# Patient Record
Sex: MALE | Race: WHITE
[De-identification: names, ages, dates, MRNs, and addresses within clinical notes are randomized per-mention and may not be internally consistent; named-entity substitution may affect disease eponyms.]

---

## 2018-04-03 ENCOUNTER — HOSPITAL ENCOUNTER (EMERGENCY)
Dept: HOSPITAL 62 - ER | Age: 33
Discharge: HOME | End: 2018-04-03
Payer: OTHER GOVERNMENT

## 2018-04-03 VITALS — DIASTOLIC BLOOD PRESSURE: 71 MMHG | SYSTOLIC BLOOD PRESSURE: 129 MMHG

## 2018-04-03 DIAGNOSIS — L03.211: ICD-10-CM

## 2018-04-03 DIAGNOSIS — L30.9: Primary | ICD-10-CM

## 2018-04-03 PROCEDURE — 99282 EMERGENCY DEPT VISIT SF MDM: CPT

## 2018-04-03 NOTE — ER DOCUMENT REPORT
ED General





- General


Chief Complaint: Rash


Stated Complaint: RASH


Time Seen by Provider: 04/03/18 22:51


TRAVEL OUTSIDE OF THE [place] IN LAST 30 DAYS: No





- HPI


Notes: 





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  Patient states over the 

last week he has developed a itchy at times sometimes burning rash around his 

right lateral orbital region, cheek face, and a couple other scattered areas of 

his body.  He had some type of telemedicine consult yesterday and was put on 

antifungal cream.  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  

No history of MRSA.  No other modifying factors, no other associated symptoms, 

no other provocative or palliative factors.





- Related Data


Allergies/Adverse Reactions: 


 





No Known Allergies Allergy (Unverified 04/03/18 21:53)


 











Past Medical History





- Social History


Smoking Status: Never Smoker


Drug Abuse: None


Family History: None





- Medical History


Medical History: Negative





Review of Systems





- Review of Systems


Notes: 





Review of systems as in the history of present illness, otherwise negative.





Physical Exam





- Vital signs


Vitals: 


 











Temp Pulse Resp BP Pulse Ox


 


 98.4 F   55 L  18   133/73 H  98 


 


 04/03/18 22:25  04/03/18 22:25  04/03/18 22:25  04/03/18 22:25  04/03/18 22:25














- Notes


Notes: 





General:  Well developed .


HEENT:  Normocephalic, atraumatic. Pupils equal round reactive to light.  No 

JVD.


Chest: No trauma.


Respiratory: Good air exchange, normal excursion.


Cardiac: Regular rhythm. No murmurs or gallops.


Abdomen: Soft, benign. Nondistended. Nontender.


Back: No asymmetry or gross abnormality.


Motor: Grossly normal power and tone.


Neurologic: Alert, nonfocal. Cranial nerves II-12 are intact. Sensation intact.


Vascular: Well perfused. Normal peripheral pulses.


Skin: No petechiae or purpura.  There are some scaly mildly erythematous areas 

about the right lateral orbital region, right cheek and chest.  They have a 

consistency of possible dermatitis versus a secondary infection.  Less likely 

impetigo as there is no honey colored crusting but this would be considered.








Course





- Re-evaluation


Re-evalutation: 





04/03/18 23:36


This is a well-appearing male with the after mentioned symptoms.  I do not 

think this is fungal as he has no real risk for this.  Would consider an 

atypical bacterial infection or cellulitis, consider MRSA, consider nonspecific 

dermatitis.  Good air on the side of caution and cover him for bacterial 

infection as well as dermatitis, is given a prescription for Keflex, Bactrim 

and prednisone.





- Vital Signs


Vital signs: 


 











Temp Pulse Resp BP Pulse Ox


 


 98.4 F   55 L  18   133/73 H  98 


 


 04/03/18 22:25  04/03/18 22:25  04/03/18 22:25  04/03/18 22:25  04/03/18 22:25














Discharge





- Discharge


Clinical Impression: 


 Dermatitis





Cellulitis


Qualifiers:


 Site of cellulitis: face Qualified Code(s): L03.211 - Cellulitis of face





Condition: Good


Disposition: HOME, SELF-CARE


Instructions:  Contact Dermatitis ([facility]), Cellulitis ([facility]), Corticosteroid 

Medication ([facility])


Prescriptions: 


Cephalexin Monohydrate [Keflex 500 mg Capsule] 500 mg PO Q6H 7 Days  capsule


Prednisone [Deltasone 20 mg Tablet] 1 tab PO DAILY 5 Days  tablet


Sulfamethoxazole/Trimethoprim [Bactrim Ds Tablet] 1 each PO BID 7 Days  tablet


Referrals: 


NKECHI YOUNG MD [Primary Care Provider] - Follow up as needed